# Patient Record
(demographics unavailable — no encounter records)

---

## 2024-11-11 NOTE — PHYSICAL EXAM
[Obese, well nourished, in no acute distress] : obese, well nourished, in no acute distress [Normal] : well developed, well nourished, in no acute distress [de-identified] : Well, healthy appearing adult  [de-identified] : EOMI, no conjunctival injection, anicteric [de-identified] : Equal chest rise, non-labored respirations, no audible wheezing. [de-identified] : soft, NT, ND, no diastasis or hernias appreciated, incision from appendectomy well healed  [de-identified] : ANGELA [de-identified] : ANO x 3

## 2024-11-11 NOTE — REVIEW OF SYSTEMS
[Patient Intake Form Reviewed] : Patient intake form was reviewed [Negative] : Allergic/Immunologic [Constipation] : constipation [FreeTextEntry7] : minimal constipation - see HPI

## 2024-11-11 NOTE — HISTORY OF PRESENT ILLNESS
[de-identified] : 56 year old F with a long-standing h/o obesity, who presents today for follow up after switching weight loss medication Wegovy to Zepbound. Reports mild constipation but manageable with increase in fiber and water. Weight loss since last visit. Consuming adequate protein intake with 3meals/day. Daily water intake adequate ~64oz/day. Walking, hiking, biking for activities plus strength training 1-2x/wk. Sleeping well. No abdominal pain, reflux, nausea, or vomiting.   Starting weight at initial consult: 208 lb Current weight at today's visit: 188 lb   Anti-obesity medication(s): Zepbound (switched from Wegovy due to mild nausea, constipation, fatigue, HAs) Start date: Aug 2024 (Wegovy was started July 2024) Current dose: 7.5mg (injection #3 11/7/2024) Side-effects from anti-obesity medication(s): pt reports minimal constipation - see HPI Obesity comorbidities: none Comorbidities improved/resolved: N/A History of bariatric surgery: No

## 2024-11-11 NOTE — HISTORY OF PRESENT ILLNESS
[de-identified] : 56 year old F with a long-standing h/o obesity, who presents today for follow up after switching weight loss medication Wegovy to Zepbound. Reports mild constipation but manageable with increase in fiber and water. Weight loss since last visit. Consuming adequate protein intake with 3meals/day. Daily water intake adequate ~64oz/day. Walking, hiking, biking for activities plus strength training 1-2x/wk. Sleeping well. No abdominal pain, reflux, nausea, or vomiting.   Starting weight at initial consult: 208 lb Current weight at today's visit: 188 lb   Anti-obesity medication(s): Zepbound (switched from Wegovy due to mild nausea, constipation, fatigue, HAs) Start date: Aug 2024 (Wegovy was started July 2024) Current dose: 7.5mg (injection #3 11/7/2024) Side-effects from anti-obesity medication(s): pt reports minimal constipation - see HPI Obesity comorbidities: none Comorbidities improved/resolved: N/A History of bariatric surgery: No

## 2024-11-11 NOTE — ASSESSMENT
[FreeTextEntry1] : 56 year old F with a long-standing h/o obesity, who presents today for follow up after switching weight loss medication Wegovy to Zepbound. Reports mild constipation but manageable with increase in fiber and water. Weight loss since last visit. Doing well overall.   Reviewed guidelines about nutrition and snacking - protein focus diet with 3 meals/day Discussed food choices and timing of meals with front loading early in the day Avoid processed/refined foods Continue high fiber diet to minimize constipation symptom Maintain adequate daily zero calorie liquid intake, goal of 64 oz/day Maintain daily exercise regimen incorporating cardio and strength training as tolerated Daily goal 8-10k steps/day   Return to office in Jan 2025 Increase Zepbound to 10mg x 3mos Prescription sent to [facility] Authorization expiring Jan 2025 - will notify authorization team to initiate renewal Fasting labs to be done Dec 2024 - ordered today, requisition given to pt Instructed pt to call the office sooner for issues/concerns All questions answered Pt verbalized understanding and in agreement of the above     Additional time spent before and after visit reviewing chart

## 2024-11-11 NOTE — PHYSICAL EXAM
[Obese, well nourished, in no acute distress] : obese, well nourished, in no acute distress [Normal] : well developed, well nourished, in no acute distress [de-identified] : Well, healthy appearing adult  [de-identified] : EOMI, no conjunctival injection, anicteric [de-identified] : Equal chest rise, non-labored respirations, no audible wheezing. [de-identified] : soft, NT, ND, no diastasis or hernias appreciated, incision from appendectomy well healed  [de-identified] : ANGELA [de-identified] : ANO x 3

## 2025-01-22 NOTE — ASSESSMENT
Left message for patient to call back. X 2 mailed letter   [FreeTextEntry1] : 56 year old F with a long-standing h/o obesity, who presents today for follow up. 6 lb weight loss since last visit ~12 weeks ago. Currently on Zepbound 10 mg/weekly. Happy with progress.  Doing well overall.   Last blood work done 12/18/24 Next blood work due 6/2025  Reviewed guidelines about nutrition and snacking - protein focus diet with 3 meals/day Discussed food choices and timing of meals with front loading early in the day Avoid processed/refined foods Continue high fiber diet to minimize constipation symptom Maintain adequate daily zero calorie liquid intake, goal of 64 oz/day Maintain daily exercise regimen incorporating cardio and strength training as tolerated Daily goal 8-10k steps/day   Follow up in 3 months Continue Zepbound 10mg x 3mos as per pt request Prescription sent to [facility]  Instructed pt to call the office sooner for issues/concerns All questions answered Pt verbalized understanding and in agreement of the above   Additional time spent before and after visit reviewing chart

## 2025-01-22 NOTE — HISTORY OF PRESENT ILLNESS
[de-identified] : 56 year old F with a long-standing h/o obesity, who presents today for follow up. 6 lb weight loss since last visit ~12 weeks ago. Currently on Zepbound 10 mg/weekly. Happy with progress. Consuming adequate protein intake with 3meals/day. Trying to drink adequate zero calorie fluids daily. Walking daily and strength training 2x per week for activities. Sleeping well. No abdominal pain, constipation, diarrhea, reflux, nausea, or vomiting.   Starting weight at initial consult: 208 lb Current weight at today's visit: 182 lb   Anti-obesity medication(s): Zepbound (switched from Wegovy due to mild nausea, constipation, fatigue, HAs) Start date: Aug 2024 (Wegovy was started July 2024) Current dose: 10 mg Side-effects from anti-obesity medication(s): none Obesity comorbidities: none Comorbidities improved/resolved: N/A History of bariatric surgery: No

## 2025-01-22 NOTE — PHYSICAL EXAM
[Obese, well nourished, in no acute distress] : obese, well nourished, in no acute distress [Normal] : affect appropriate [de-identified] : Well, healthy appearing adult  [de-identified] : EOMI, no conjunctival injection, anicteric [de-identified] : Equal chest rise, non-labored respirations, no audible wheezing. [de-identified] : ANGELA [de-identified] : ANO x 3

## 2025-04-25 NOTE — REASON FOR VISIT
[Other Location: e.g. School (Enter Location, City,State)___] : at [unfilled], at the time of the visit. [Telehealth (audio & video)] : This visit was provided via telehealth using real-time 2-way audio visual technology. [Verbal consent obtained from patient] : the patient, [unfilled] [Follow-Up Visit] : a follow-up visit for [Other___] : [unfilled] [Medical Office: ([facility])___] : at the medical office located in

## 2025-04-25 NOTE — REASON FOR VISIT
[Other Location: e.g. School (Enter Location, City,State)___] : at [unfilled], at the time of the visit. [Telehealth (audio & video)] : This visit was provided via telehealth using real-time 2-way audio visual technology. [Verbal consent obtained from patient] : the patient, [unfilled] [Follow-Up Visit] : a follow-up visit for [Other___] : [unfilled] [Medical Office: ([facility])___] : at the medical office located in

## 2025-04-30 NOTE — HISTORY OF PRESENT ILLNESS
[de-identified] : 57 year old F with a long-standing h/o obesity, who presents today for follow up of weight loss medication Zepbound. Pt wishes to increase dose as goal weight 150's lb. Weight loss 8 lbs since last visit 3mos ago. Consuming adequate protein intake with 3meals/day, including 1 protein shake/day. Trying to maintain adequate daily water intake, has mild constipation when not drinking enough. Walking, hiking, biking for activities plus strength training 1-2x/wk. Sleeping well. No abdominal pain, reflux, nausea, or vomiting.   Starting weight at initial consult: 208 lb Current weight at today's visit: 174 lb Goal weight: 150's   Anti-obesity medication(s): Zepbound (switched from Wegovy due to mild nausea, constipation, fatigue, HAs) Start date: Aug 2024 (Wegovy was started July 2024) Current dose: 10mg (on last box of 3) Side-effects from anti-obesity medication(s): pt reports minimal constipation - see HPI Obesity comorbidities: none Comorbidities improved/resolved: N/A History of bariatric surgery: No

## 2025-04-30 NOTE — ASSESSMENT
[FreeTextEntry1] : 57 year old F with a long-standing h/o obesity, who presents today for follow up of weight loss medication Zepbound. Pt wishes to increase dose as goal weight 150's lb. Weight loss 8 lbs since last visit 3mos ago. Doing well overall.   Reviewed guidelines about nutrition and snacking - protein focus diet with 3 meals/day Discussed food choices and timing of meals with front loading early in the day Avoid processed/refined foods Continue high fiber diet to minimize constipation symptom Maintain adequate daily zero calorie liquid intake, goal of 64 oz/day Maintain daily exercise regimen incorporating cardio and strength training as tolerated Discussed importance of strength training, minimum 3x/wk Daily goal 8-10k steps/day  Next fasting labs to be done June 2025 - ordered today, pt made aware   Return to office in July 2025 Increase Zepbound to 12.5mg- current auth expires 02/09/2026 Prescription sent to [facility] Pt will call week of May 19th for monitoring of adverse symptoms, if asymptomatic will increase Zepbound to 15mg Instructed pt to call the office sooner for issues/concerns All questions answered Pt verbalized understanding and in agreement of the above     Additional time spent before and after visit reviewing chart

## 2025-04-30 NOTE — PHYSICAL EXAM
[Obese, well nourished, in no acute distress] : obese, well nourished, in no acute distress [Normal] : affect appropriate [de-identified] : Well, healthy appearing adult  [de-identified] : EOMI, no conjunctival injection, anicteric [de-identified] : deferred as visit is TEB  [de-identified] : ANO x 3

## 2025-04-30 NOTE — HISTORY OF PRESENT ILLNESS
[de-identified] : 57 year old F with a long-standing h/o obesity, who presents today for follow up of weight loss medication Zepbound. Pt wishes to increase dose as goal weight 150's lb. Weight loss 8 lbs since last visit 3mos ago. Consuming adequate protein intake with 3meals/day, including 1 protein shake/day. Trying to maintain adequate daily water intake, has mild constipation when not drinking enough. Walking, hiking, biking for activities plus strength training 1-2x/wk. Sleeping well. No abdominal pain, reflux, nausea, or vomiting.   Starting weight at initial consult: 208 lb Current weight at today's visit: 174 lb Goal weight: 150's   Anti-obesity medication(s): Zepbound (switched from Wegovy due to mild nausea, constipation, fatigue, HAs) Start date: Aug 2024 (Wegovy was started July 2024) Current dose: 10mg (on last box of 3) Side-effects from anti-obesity medication(s): pt reports minimal constipation - see HPI Obesity comorbidities: none Comorbidities improved/resolved: N/A History of bariatric surgery: No

## 2025-04-30 NOTE — REVIEW OF SYSTEMS
[Patient Intake Form Reviewed] : Patient intake form was reviewed [Constipation] : constipation [Negative] : Allergic/Immunologic [FreeTextEntry7] : minimal constipation - see HPI

## 2025-04-30 NOTE — PHYSICAL EXAM
[Obese, well nourished, in no acute distress] : obese, well nourished, in no acute distress [Normal] : affect appropriate [de-identified] : Well, healthy appearing adult  [de-identified] : EOMI, no conjunctival injection, anicteric [de-identified] : deferred as visit is TEB  [de-identified] : ANO x 3

## 2025-07-16 NOTE — HISTORY OF PRESENT ILLNESS
[de-identified] : 56yo F with a long-standing h/o obesity, who presents today for follow up of weight loss medication Zepbound. Weight loss 7 lbs since last visit 3mos ago. Wishes to stay on current dose until she reaches her goal weight of 150's. Consuming adequate protein intake with 3 meals/day, including 1 protein shake/day. Trying to maintain adequate daily water intake, ~40oz/day. Walking, hiking, biking for activities plus strength training 2x/wk. Sleeping well. No abdominal pain, reflux, nausea/vomiting, constipation (occasionally when not drinking enough water)/diarrhea, visual changes or mood disturbances.   Starting weight at initial consult: 208 lb Current weight at today's visit: 167 lb Goal weight: 150's lb   Anti-obesity medication(s): Zepbound (switched from Wegovy due to mild nausea, constipation, fatigue, HAs) Start date: Aug 2024 (Wegovy was started July 2024) Current dose: 15mg Side-effects from anti-obesity medication(s): pt reports minimal constipation - see HPI Obesity comorbidities: none Comorbidities improved/resolved: N/A History of bariatric surgery: No

## 2025-07-16 NOTE — ASSESSMENT
[FreeTextEntry1] : 58yo F with a long-standing h/o obesity, who presents today for follow up of weight loss medication Zepbound. Weight loss 7 lbs since last visit 3mos ago. Wishes to stay on current dose until she reaches her goal weight of 150's. Doing well overall.   Reviewed guidelines about nutrition and exercise Maintain protein focus diet with 3 meals/day Discussed food choices and timing of meals with front loading early in the day Avoid processed/refined foods Continue high fiber diet to minimize constipation symptom Maintain adequate daily zero calorie liquid intake, goal of 64 oz/day Maintain daily exercise regimen incorporating cardio and strength training as tolerated Discussed importance of strength training, minimum 3x/wk Daily goal 8-10k steps/day  Labs 7/11/2025, results reviewed with pt Elevated cholesterol and LDL from previous bloodwork - advised pt to lower saturated fats Next fasting labs to be done Jan 2026   Return to office in July 2025 Continue Zepbound 15mg - current auth expires 02/09/2026 Prescription sent to [facility] Instructed pt to call the office sooner for issues/concerns All questions answered Pt verbalized understanding and in agreement of the above     Additional time spent before and after visit reviewing chart

## 2025-07-16 NOTE — ASSESSMENT
[FreeTextEntry1] : 56yo F with a long-standing h/o obesity, who presents today for follow up of weight loss medication Zepbound. Weight loss 7 lbs since last visit 3mos ago. Wishes to stay on current dose until she reaches her goal weight of 150's. Doing well overall.   Reviewed guidelines about nutrition and exercise Maintain protein focus diet with 3 meals/day Discussed food choices and timing of meals with front loading early in the day Avoid processed/refined foods Continue high fiber diet to minimize constipation symptom Maintain adequate daily zero calorie liquid intake, goal of 64 oz/day Maintain daily exercise regimen incorporating cardio and strength training as tolerated Discussed importance of strength training, minimum 3x/wk Daily goal 8-10k steps/day  Labs 7/11/2025, results reviewed with pt Elevated cholesterol and LDL from previous bloodwork - advised pt to lower saturated fats Next fasting labs to be done Jan 2026   Return to office in July 2025 Continue Zepbound 15mg - current auth expires 02/09/2026 Prescription sent to [facility] Instructed pt to call the office sooner for issues/concerns All questions answered Pt verbalized understanding and in agreement of the above     Additional time spent before and after visit reviewing chart

## 2025-07-16 NOTE — PHYSICAL EXAM
[Obese, well nourished, in no acute distress] : obese, well nourished, in no acute distress [Normal] : affect appropriate [de-identified] : Well, healthy appearing adult  [de-identified] : EOMI, no conjunctival injection, anicteric [de-identified] : No visualized masses, JVD or audible bruits noted  [de-identified] : Equal chest rise, non-labored respirations, no audible wheezing  [de-identified] : Regular rate, no audible carotid bruits or JVD appreciated  [de-identified] : ANGELA

## 2025-07-16 NOTE — HISTORY OF PRESENT ILLNESS
[de-identified] : 58yo F with a long-standing h/o obesity, who presents today for follow up of weight loss medication Zepbound. Weight loss 7 lbs since last visit 3mos ago. Wishes to stay on current dose until she reaches her goal weight of 150's. Consuming adequate protein intake with 3 meals/day, including 1 protein shake/day. Trying to maintain adequate daily water intake, ~40oz/day. Walking, hiking, biking for activities plus strength training 2x/wk. Sleeping well. No abdominal pain, reflux, nausea/vomiting, constipation (occasionally when not drinking enough water)/diarrhea, visual changes or mood disturbances.   Starting weight at initial consult: 208 lb Current weight at today's visit: 167 lb Goal weight: 150's lb   Anti-obesity medication(s): Zepbound (switched from Wegovy due to mild nausea, constipation, fatigue, HAs) Start date: Aug 2024 (Wegovy was started July 2024) Current dose: 15mg Side-effects from anti-obesity medication(s): pt reports minimal constipation - see HPI Obesity comorbidities: none Comorbidities improved/resolved: N/A History of bariatric surgery: No

## 2025-07-16 NOTE — PHYSICAL EXAM
[Obese, well nourished, in no acute distress] : obese, well nourished, in no acute distress [Normal] : affect appropriate [de-identified] : Well, healthy appearing adult  [de-identified] : EOMI, no conjunctival injection, anicteric [de-identified] : No visualized masses, JVD or audible bruits noted  [de-identified] : Equal chest rise, non-labored respirations, no audible wheezing  [de-identified] : Regular rate, no audible carotid bruits or JVD appreciated  [de-identified] : ANGELA